# Patient Record
Sex: MALE
[De-identification: names, ages, dates, MRNs, and addresses within clinical notes are randomized per-mention and may not be internally consistent; named-entity substitution may affect disease eponyms.]

---

## 2020-02-21 ENCOUNTER — HOSPITAL ENCOUNTER (INPATIENT)
Dept: HOSPITAL 5 - 3A | Age: 70
LOS: 5 days | Discharge: HOME | DRG: 885 | End: 2020-02-26
Attending: PSYCHIATRY & NEUROLOGY | Admitting: PSYCHIATRY & NEUROLOGY
Payer: MEDICARE

## 2020-02-21 DIAGNOSIS — I42.9: ICD-10-CM

## 2020-02-21 DIAGNOSIS — N40.0: ICD-10-CM

## 2020-02-21 DIAGNOSIS — Z60.2: ICD-10-CM

## 2020-02-21 DIAGNOSIS — F32.2: Primary | ICD-10-CM

## 2020-02-21 DIAGNOSIS — B20: ICD-10-CM

## 2020-02-21 DIAGNOSIS — I10: ICD-10-CM

## 2020-02-21 DIAGNOSIS — C18.9: ICD-10-CM

## 2020-02-21 DIAGNOSIS — Z79.899: ICD-10-CM

## 2020-02-21 LAB
ALBUMIN SERPL-MCNC: 3.3 G/DL (ref 3.9–5)
ALT SERPL-CCNC: 15 UNITS/L (ref 7–56)
ANISOCYTOSIS BLD QL SMEAR: (no result)
BAND NEUTROPHILS # (MANUAL): 0 K/MM3
BILIRUB UR QL STRIP: (no result)
BLOOD UR QL VISUAL: (no result)
BUN SERPL-MCNC: 14 MG/DL (ref 9–20)
BUN/CREAT SERPL: 16 %
CALCIUM SERPL-MCNC: 9.4 MG/DL (ref 8.4–10.2)
HCT VFR BLD CALC: 41.8 % (ref 35.5–45.6)
HDLC SERPL-MCNC: 49 MG/DL (ref 40–59)
HEMOLYSIS INDEX: 14
HGB BLD-MCNC: 13.5 GM/DL (ref 11.8–15.2)
MCHC RBC AUTO-ENTMCNC: 32 % (ref 32–34)
MCV RBC AUTO: 105 FL (ref 84–94)
MUCOUS THREADS #/AREA URNS HPF: (no result) /HPF
MYELOCYTES # (MANUAL): 0 K/MM3
PH UR STRIP: 8 [PH] (ref 5–7)
PLATELET # BLD: 239 K/MM3 (ref 140–440)
PROMYELOCYTES # (MANUAL): 0 K/MM3
PROT UR STRIP-MCNC: (no result) MG/DL
RBC # BLD AUTO: 3.98 M/MM3 (ref 3.65–5.03)
RBC #/AREA URNS HPF: 4 /HPF (ref 0–6)
TOTAL CELLS COUNTED BLD: 100
UROBILINOGEN UR-MCNC: < 2 MG/DL (ref ?–2)
WBC #/AREA URNS HPF: < 1 /HPF (ref 0–6)

## 2020-02-21 PROCEDURE — 82962 GLUCOSE BLOOD TEST: CPT

## 2020-02-21 PROCEDURE — 80061 LIPID PANEL: CPT

## 2020-02-21 PROCEDURE — 84443 ASSAY THYROID STIM HORMONE: CPT

## 2020-02-21 PROCEDURE — 80053 COMPREHEN METABOLIC PANEL: CPT

## 2020-02-21 PROCEDURE — 94760 N-INVAS EAR/PLS OXIMETRY 1: CPT

## 2020-02-21 PROCEDURE — 81001 URINALYSIS AUTO W/SCOPE: CPT

## 2020-02-21 PROCEDURE — 83036 HEMOGLOBIN GLYCOSYLATED A1C: CPT

## 2020-02-21 PROCEDURE — 85025 COMPLETE CBC W/AUTO DIFF WBC: CPT

## 2020-02-21 PROCEDURE — 85007 BL SMEAR W/DIFF WBC COUNT: CPT

## 2020-02-21 PROCEDURE — 36415 COLL VENOUS BLD VENIPUNCTURE: CPT

## 2020-02-21 RX ADMIN — DOXEPIN HYDROCHLORIDE SCH MG: 10 CAPSULE ORAL at 22:06

## 2020-02-21 SDOH — SOCIAL STABILITY - SOCIAL INSECURITY: PROBLEMS RELATED TO LIVING ALONE: Z60.2

## 2020-02-22 RX ADMIN — SERTRALINE SCH MG: 50 TABLET, FILM COATED ORAL at 10:21

## 2020-02-22 RX ADMIN — APIXABAN SCH MG: 5 TABLET, FILM COATED ORAL at 10:21

## 2020-02-22 RX ADMIN — MIRTAZAPINE SCH MG: 15 TABLET, FILM COATED ORAL at 21:38

## 2020-02-22 RX ADMIN — METOPROLOL SUCCINATE SCH MG: 25 TABLET, FILM COATED, EXTENDED RELEASE ORAL at 10:22

## 2020-02-22 RX ADMIN — DOXEPIN HYDROCHLORIDE SCH MG: 10 CAPSULE ORAL at 21:38

## 2020-02-22 RX ADMIN — APIXABAN SCH MG: 5 TABLET, FILM COATED ORAL at 21:46

## 2020-02-22 RX ADMIN — LOSARTAN POTASSIUM SCH MG: 25 TABLET, FILM COATED ORAL at 10:22

## 2020-02-22 RX ADMIN — PANTOPRAZOLE SODIUM SCH MG: 40 TABLET, DELAYED RELEASE ORAL at 10:21

## 2020-02-22 RX ADMIN — DOXEPIN HYDROCHLORIDE SCH MG: 10 CAPSULE ORAL at 21:43

## 2020-02-22 NOTE — HISTORY AND PHYSICAL REPORT
GP History & Physical





- History of Present Illness


Date of admission: 02/21/20


Date of Examination: 02/22/20


Reason for Admission: Danger to self, Severe anxiety/depression


History of Present Illness: 


Darryl Lema is a 71y/o male patient who states he was admitted for "taking 

some pills." The patient says it was an "attempt to take my life." He is a/o x 

4. He is dressed appropriately. He is talkative and pleasant. He makes good eye 

contact. He is calm and cooperative. Mr. Lema says he's had "a number of 

setbacks medically." He says "because of this I OD intentional." He says "I do 

feel it was a big mistake, but I do think about dying and the afterlife." The 

patient denies ever attempting suicide or self harm behaviors in the past. He 

denies any illicit drug use, nicotine or alcohol use. He also denies any 

outpatient psychiatric treatment. The patient says he has a history of 

"depression" and he sees his "primary doctor to manage it." He says he is 

"somewhat depressed now and gets emotional talking about his health." The alex

ent states "I'm really anxious about the cancer." 





PAST PSYCHIATRIC HISTORY: 


Diagnoses: Depression, Anxiety


Suicide attempts or Self-harm behavior: this time only


Prior psychiatric hospitalizations: Denies


Substance Abuse history: Denies


 Previous psychiatric medications tried: Zoloft, ambien


Outpatient treatment: Primary





PAST MEDICAL HISTORY: 


Colon Cancer, HIV, BPH, HTN





Family Psychiatric History


None reported





SOCIAL HISTORY


Marital Status: Single


Living Arrangements: Lives alone


Employment Status: Retired


Access to guns/weapons: Denied


Education: 2 years of college


History of Abuse: Denies


 Legal History: Denies





ROS:


Constitutional: Negative for weight loss


ENT: Negative for stridor


Respiratory: Negative for cough or hemoptysis


All other systems reviewed and are negative





MSE


 Appearance: Dressed appropriately, good eye contact


 Behavior: calm and cooperative


 Mood: "somewhat depressed"


 Affect: Congruent


 Thought Process: goal directed


 Speech: normal tone and pace


 Thought Content


    Suicidal: Suicide attempt, passive thoughts at present


    Homicidal: Denies


    Hallucinations: Denies


    Delusions: Denies


 Consciousness: Alert


 Cognition/Memory: Good


 Insight/Judgment: Limited





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





Treatment Plan 


Due to the psychiatric conditions and treatment listed in the Assessment and 

Plan - the patient requires continued hospitalization.


Will continue inpatient treatment to allow for medication adjustment and 

monitoring.


Will continue q15 min safety checks.


Will encourage the use of environmental modifications and non-pharmacologic 

approaches for the management of behavioral and psychological symptoms.


Medication adjustment made today


Will continue current psych medications


Monitor for medication side effects.


The patient will continue on medications for physical illnesses, and Hospitalist

will closely monitor these


Continue intensive physical and occupational therapies.


Monitor patient's mood, sleep, appetite, and behavior closely.


Encourage patient to participate in individual and group therapeutic sessions on

the elizabeth.


Will provide a safe and therapeutic environment for patient.


This is an acknowledgement statement that Dennies Gilomen is a 71y/o male who 

requires inpatient psychiatric admission for treatment which could reasonably be

expected to improve the patient's condition for Major Depressive Disorder, 

Severe with a Suicidal Attempt. 


Estimated period of time patient will need to remain in the hospital: [7 ]


Legal Status: Voluntary


Reaction to Hospitalization: Accepting





Medications and Allergies


                                    Allergies











Allergy/AdvReac Type Severity Reaction Status Date / Time


 


No Known Allergies Allergy   Unverified 02/21/20 14:20











                                Home Medications











 Medication  Instructions  Recorded  Confirmed  Last Taken  Type


 


Ambien 10 mg PO HS 02/22/20 02/22/20 Unknown History


 


Ciprofloxacin HCl 500 mg PO BID 02/22/20 02/22/20 Unknown History


 


Descovy 200-25 mg Tablet 1 tab PO DAILY 02/22/20 02/22/20 Unknown History


 


Eliquis 5 mg PO BID 02/22/20 02/22/20 Unknown History


 


Lamictal 25 mg PO DAILY 02/22/20 02/22/20 Unknown History


 


Losartan 25 mg PO DAILY 02/22/20 02/22/20 Unknown History


 


Metoprolol Succinate 25 mg PO DAILY 02/22/20 02/22/20 Unknown History


 


Metronidazole 500 mg PO Q8HR MDD 21 TABLETS 02/22/20 02/22/20 Unknown History


 


Mirtazapine 7.5 mg PO QHS 02/22/20 02/22/20 Unknown History


 


Pantoprazole Sodium 40 mg PO DAILY 02/22/20 02/22/20 Unknown History


 


Sertraline 50 mg PO DAILY 02/22/20 02/22/20 Unknown History


 


Tivicay 50 mg PO DAILY 02/22/20 02/22/20 Unknown History


 


Xanax TAB 0.5 mg PO BID PRN 02/22/20 02/22/20 Unknown History











Active Meds: 


Active Medications





Alprazolam (Xanax)  0.5 mg PO BID PRN


   PRN Reason: Anxiety


Apixaban (Eliquis)  5 mg PO Q12HR [facility]


Doxepin HCl (Sinequan)  10 mg PO QHS [facility]


   Last Admin: 02/21/20 22:06 Dose:  10 mg


   Documented by: 


Melatonin (Melatonin)  5 mg PO QHS PRN


   PRN Reason: Sleep


   Last Admin: 02/21/20 22:06 Dose:  5 mg


   Documented by: 


Mirtazapine (Remeron)  7.5 mg PO QHS [facility]


Miscellaneous Medication (Descovy 200-25 Mg Tablet)  1 tab PO DAILY [facility]


Miscellaneous Medication (Lamictal)  25 mg PO DAILY [facility]


Miscellaneous Medication (Losartan)  25 mg PO DAILY [facility]


Miscellaneous Medication (Metoprolol Succinate)  25 mg PO DAILY [facility]


Miscellaneous Medication (Pantoprazole Sodium)  40 mg PO DAILY [facility]


Miscellaneous Medication (Tivicay)  50 mg PO DAILY [facility]


Miscellaneous Medication (Xanax Tab)  0.5 mg PO BID PRN


   PRN Reason: Anxiety


Sertraline HCl (Zoloft)  50 mg PO QDAY [facility]


Ziprasidone (Geodon)  10 mg IM Q4H PRN


   PRN Reason: Agitation











Results





- Results


Labs/Vitals: 


                             Laboratory Last Values











WBC  13.0 K/mm3 (4.5-11.0)  H  02/21/20  21:05    


 


RBC  3.98 M/mm3 (3.65-5.03)   02/21/20  21:05    


 


Hgb  13.5 gm/dl (11.8-15.2)   02/21/20  21:05    


 


Hct  41.8 % (35.5-45.6)   02/21/20  21:05    


 


MCV  105 fl (84-94)  H  02/21/20  21:05    


 


MCH  34 pg (28-32)  H  02/21/20  21:05    


 


MCHC  32 % (32-34)   02/21/20  21:05    


 


RDW  13.7 % (13.2-15.2)   02/21/20  21:05    


 


Plt Count  239 K/mm3 (140-440)   02/21/20  21:05    


 


Lymph #  Np   02/21/20  21:05    


 


Add Manual Diff  Complete   02/21/20  21:05    


 


Total Counted  100   02/21/20  21:05    


 


Seg Neuts % (Manual)  53.0 % (40.0-70.0)   02/21/20  21:05    


 


Band Neutrophils %  0 %  02/21/20  21:05    


 


Lymphocytes % (Manual)  33.0 % (13.4-35.0)   02/21/20  21:05    


 


Reactive Lymphs % (Man)  0 %  02/21/20  21:05    


 


Monocytes % (Manual)  11.0 % (0.0-7.3)  H  02/21/20  21:05    


 


Eosinophils % (Manual)  3.0 % (0.0-4.3)   02/21/20  21:05    


 


Basophils % (Manual)  0 % (0.0-1.8)   02/21/20  21:05    


 


Metamyelocytes %  0 %  02/21/20  21:05    


 


Myelocytes %  0 %  02/21/20  21:05    


 


Promyelocytes %  0 %  02/21/20  21:05    


 


Blast Cells %  0 %  02/21/20  21:05    


 


Nucleated RBC %  Not Reportable   02/21/20  21:05    


 


Seg Neutrophils # Man  6.9 K/mm3 (1.8-7.7)   02/21/20  21:05    


 


Band Neutrophils #  0.0 K/mm3  02/21/20  21:05    


 


Lymphocytes # (Manual)  4.3 K/mm3 (1.2-5.4)   02/21/20  21:05    


 


Abs React Lymphs (Man)  0.0 K/mm3  02/21/20  21:05    


 


Monocytes # (Manual)  1.4 K/mm3 (0.0-0.8)  H  02/21/20  21:05    


 


Eosinophils # (Manual)  0.4 K/mm3 (0.0-0.4)   02/21/20  21:05    


 


Basophils # (Manual)  0.0 K/mm3 (0.0-0.1)   02/21/20  21:05    


 


Metamyelocytes #  0.0 K/mm3  02/21/20  21:05    


 


Myelocytes #  0.0 K/mm3  02/21/20  21:05    


 


Promyelocytes #  0.0 K/mm3  02/21/20  21:05    


 


Blast Cells #  0.0 K/mm3  02/21/20  21:05    


 


WBC Morphology  Not Reportable   02/21/20  21:05    


 


Hypersegmented Neuts  Not Reportable   02/21/20  21:05    


 


Hyposegmented Neuts  Not Reportable   02/21/20  21:05    


 


Hypogranular Neuts  Not Reportable   02/21/20  21:05    


 


Smudge Cells  Not Reportable   02/21/20  21:05    


 


Toxic Granulation  Not Reportable   02/21/20  21:05    


 


Toxic Vacuolation  Not Reportable   02/21/20  21:05    


 


Dohle Bodies  Not Reportable   02/21/20  21:05    


 


Pelger-Huet Anomaly  Not Reportable   02/21/20  21:05    


 


Lucio Rods  Not Reportable   02/21/20  21:05    


 


Platelet Estimate  Consistent w auto   02/21/20  21:05    


 


Clumped Platelets  Not Reportable   02/21/20  21:05    


 


Plt Clumps, EDTA  Not Reportable   02/21/20  21:05    


 


Large Platelets  Not Reportable   02/21/20  21:05    


 


Giant Platelets  Not Reportable   02/21/20  21:05    


 


Platelet Satelliting  Not Reportable   02/21/20  21:05    


 


Plt Morphology Comment  Not Reportable   02/21/20  21:05    


 


RBC Morphology  Not Reportable   02/21/20  21:05    


 


Dimorphic RBCs  Not Reportable   02/21/20  21:05    


 


Polychromasia  Not Reportable   02/21/20  21:05    


 


Hypochromasia  Not Reportable   02/21/20  21:05    


 


Poikilocytosis  Not Reportable   02/21/20  21:05    


 


Anisocytosis  1+   02/21/20  21:05    


 


Microcytosis  Not Reportable   02/21/20  21:05    


 


Macrocytosis  Not Reportable   02/21/20  21:05    


 


Spherocytes  Not Reportable   02/21/20  21:05    


 


Pappenheimer Bodies  Not Reportable   02/21/20  21:05    


 


Sickle Cells  Not Reportable   02/21/20  21:05    


 


Target Cells  Not Reportable   02/21/20  21:05    


 


Tear Drop Cells  Not Reportable   02/21/20  21:05    


 


Ovalocytes  Not Reportable   02/21/20  21:05    


 


Helmet Cells  Not Reportable   02/21/20  21:05    


 


[place] Bodies  Not Reportable   02/21/20  21:05    


 


Cabot Rings  Not Reportable   02/21/20  21:05    


 


Elie Cells  Not Reportable   02/21/20  21:05    


 


Bite Cells  Not Reportable   02/21/20  21:05    


 


Crenated Cell  Not Reportable   02/21/20  21:05    


 


Elliptocytes  Not Reportable   02/21/20  21:05    


 


Acanthocytes (Spur)  Not Reportable   02/21/20  21:05    


 


Rouleaux  Not Reportable   02/21/20  21:05    


 


Hemoglobin C Crystals  Not Reportable   02/21/20  21:05    


 


Schistocytes  Not Reportable   02/21/20  21:05    


 


Malaria parasites  Not Reportable   02/21/20  21:05    


 


Sarabjit Bodies  Not Reportable   02/21/20  21:05    


 


Hem Pathologist Commnt  No   02/21/20  21:05    


 


Sodium  140 mmol/L (137-145)   02/21/20  21:05    


 


Potassium  4.4 mmol/L (3.6-5.0)   02/21/20  21:05    


 


Chloride  100.7 mmol/L ()   02/21/20  21:05    


 


Carbon Dioxide  25 mmol/L (22-30)   02/21/20  21:05    


 


Anion Gap  19 mmol/L  02/21/20  21:05    


 


BUN  14 mg/dL (9-20)   02/21/20  21:05    


 


Creatinine  0.9 mg/dL (0.8-1.5)   02/21/20  21:05    


 


Estimated GFR  > 60 ml/min  02/21/20  21:05    


 


BUN/Creatinine Ratio  16 %  02/21/20  21:05    


 


Glucose  145 mg/dL ()  H  02/21/20  21:05    


 


POC Glucose  157  ()  H  02/21/20  22:02    


 


Hemoglobin A1c  4.9 % (4-6)   02/21/20  21:05    


 


Calcium  9.4 mg/dL (8.4-10.2)   02/21/20  21:05    


 


Total Bilirubin  0.50 mg/dL (0.1-1.2)   02/21/20  21:05    


 


AST  33 units/L (5-40)   02/21/20  21:05    


 


ALT  15 units/L (7-56)   02/21/20  21:05    


 


Alkaline Phosphatase  73 units/L ()   02/21/20  21:05    


 


Total Protein  7.5 g/dL (6.3-8.2)   02/21/20  21:05    


 


Albumin  3.3 g/dL (3.9-5)  L  02/21/20  21:05    


 


Albumin/Globulin Ratio  0.8 %  02/21/20  21:05    


 


Triglycerides  59 mg/dL (2-149)   02/21/20  21:05    


 


Cholesterol  149 mg/dL ()   02/21/20  21:05    


 


LDL Cholesterol Direct  92 mg/dL ()   02/21/20  21:05    


 


HDL Cholesterol  49 mg/dL (40-59)   02/21/20  21:05    


 


Cholesterol/HDL Ratio  3.04 %  02/21/20  21:05    


 


TSH  4.370 mlU/mL (0.270-4.200)  H  02/21/20  21:05    


 


Urine Color  Yellow  (Yellow)   02/21/20  Unknown


 


Urine Turbidity  Clear  (Clear)   02/21/20  Unknown


 


Urine pH  8.0  (5.0-7.0)  H  02/21/20  Unknown


 


Ur Specific Gravity  1.009  (1.003-1.030)   02/21/20  Unknown


 


Urine Protein  <15 mg/dl mg/dL (Negative)   02/21/20  Unknown


 


Urine Glucose (UA)  Neg mg/dL (Negative)   02/21/20  Unknown


 


Urine Ketones  Neg mg/dL (Negative)   02/21/20  Unknown


 


Urine Blood  Neg  (Negative)   02/21/20  Unknown


 


Urine Nitrite  Neg  (Negative)   02/21/20  Unknown


 


Urine Bilirubin  Neg  (Negative)   02/21/20  Unknown


 


Urine Urobilinogen  < 2.0 mg/dL (<2.0)   02/21/20  Unknown


 


Ur Leukocyte Esterase  Neg  (Negative)   02/21/20  Unknown


 


Urine WBC (Auto)  < 1.0 /HPF (0.0-6.0)   02/21/20  Unknown


 


Urine RBC (Auto)  4.0 /HPF (0.0-6.0)   02/21/20  Unknown


 


Urine Mucus  Few /HPF  02/21/20  Unknown








                                Last Vital Signs











Temp  97.7 F   02/21/20 22:00


 


Pulse  64   02/21/20 22:00


 


Resp  18   02/21/20 22:00


 


BP  156/70   02/21/20 22:00


 


Pulse Ox  91   02/21/20 22:00














Physical Examination





- Constitutional


Vitals: 


                                   Vital Signs











Temp Pulse Resp BP Pulse Ox


 


 97.7 F   64   18   156/70   91 


 


 02/21/20 22:00  02/21/20 22:00  02/21/20 22:00  02/21/20 22:00  02/21/20 22:00








                           Temperature -Last 24 Hours











Temperature                    97.7 F

















Mental Status Exam





- Vital signs


                                Last Vital Signs











Temp  97.7 F   02/21/20 22:00


 


Pulse  64   02/21/20 22:00


 


Resp  18   02/21/20 22:00


 


BP  156/70   02/21/20 22:00


 


Pulse Ox  91   02/21/20 22:00














Physician Certification





- Certification Statement


Physician Certification Statement: 


This is an acknowledgement statement that DARRYL LEMA is a 70 year old M who 

requires inpatient psychiatric admission for treatment which could reasonably be

expected to improve the patient's condition for 





Estimated period of time patient will need to remain in the hospital: [ ]





Plan for post-hospital care: [ ]

## 2020-02-22 NOTE — CONSULTATION
History of Present Illness





- Reason for Consult


Consult date: 02/22/20





- History of Present Illness





70-year-old male with past medical history of major depression, cardiomyopathy, 

colon cancer, HIV, BPH and hypertension who presented to the [facility] psych unit 

with suicide attempt after attempting to take "some pills".  The patient denies 

any complaints of chest pain or shortness of breath.  No headache or visual 

disturbances.  No cough or cold-like symptoms.  No fever chills.  Consultation 

was called for the hospitalist service for management of his above medical 

problems.





Past History


Past Medical History: HIV/AIDS, hypertension, other (Depression, BPH, colon 

cancer)


Past Surgical History: Other (Rectal surgery)


Social history: no significant social history


Family history: no significant family history





Medications and Allergies


                                    Allergies











Allergy/AdvReac Type Severity Reaction Status Date / Time


 


No Known Allergies Allergy   Unverified 02/21/20 14:20











                                Home Medications











 Medication  Instructions  Recorded  Confirmed  Last Taken  Type


 


Ambien 10 mg PO HS 02/22/20 02/22/20 Unknown History


 


Ciprofloxacin HCl 500 mg PO BID 02/22/20 02/22/20 Unknown History


 


Descovy 200-25 mg Tablet 1 tab PO DAILY 02/22/20 02/22/20 Unknown History


 


Eliquis 5 mg PO BID 02/22/20 02/22/20 Unknown History


 


Lamictal 25 mg PO DAILY 02/22/20 02/22/20 Unknown History


 


Losartan 25 mg PO DAILY 02/22/20 02/22/20 Unknown History


 


Metoprolol Succinate 25 mg PO DAILY 02/22/20 02/22/20 Unknown History


 


Metronidazole 500 mg PO Q8HR MDD 21 TABLETS 02/22/20 02/22/20 Unknown History


 


Mirtazapine 7.5 mg PO QHS 02/22/20 02/22/20 Unknown History


 


Pantoprazole Sodium 40 mg PO DAILY 02/22/20 02/22/20 Unknown History


 


Sertraline 50 mg PO DAILY 02/22/20 02/22/20 Unknown History


 


Tivicay 50 mg PO DAILY 02/22/20 02/22/20 Unknown History


 


Xanax TAB 0.5 mg PO BID PRN 02/22/20 02/22/20 Unknown History











Active Meds: 


Active Medications





Alprazolam (Xanax)  0.5 mg PO BID PRN


   PRN Reason: Anxiety


Apixaban (Eliquis)  5 mg PO Q12HR MARITZA


   Last Admin: 02/22/20 10:21 Dose:  5 mg


   Documented by: 


Doxepin HCl (Sinequan)  10 mg PO QHS [facility]


   Last Admin: 02/21/20 22:06 Dose:  10 mg


   Documented by: 


Lamotrigine (Lamictal)  25 mg PO QDAY [facility]


   Last Admin: 02/22/20 10:21 Dose:  25 mg


   Documented by: 


Losartan Potassium (Cozaar)  25 mg PO QDAY [facility]


   Last Admin: 02/22/20 10:22 Dose:  25 mg


   Documented by: 


Melatonin (Melatonin)  5 mg PO QHS PRN


   PRN Reason: Sleep


   Last Admin: 02/21/20 22:06 Dose:  5 mg


   Documented by: 


Metoprolol Succinate (Metoprolol Xl)  25 mg PO QDAY [facility]


   Last Admin: 02/22/20 10:22 Dose:  25 mg


   Documented by: 


Mirtazapine (Remeron)  7.5 mg PO QHS [facility]


Miscellaneous Medication (Descovy 200-25 Mg Tablet)  1 tab PO DAILY [facility]


   Last Admin: 02/22/20 10:24 Dose:  1 tab


   Documented by: 


Miscellaneous Medication (Tivicay)  50 mg PO DAILY [facility]


   Last Admin: 02/22/20 10:23 Dose:  50 mg


   Documented by: 


Pantoprazole Sodium (Protonix)  40 mg PO DAILY [facility]


   Last Admin: 02/22/20 10:21 Dose:  40 mg


   Documented by: 


Sertraline HCl (Zoloft)  100 mg PO QDAY [facility]


   Last Admin: 02/22/20 10:21 Dose:  100 mg


   Documented by: 


Ziprasidone (Geodon)  10 mg IM Q4H PRN


   PRN Reason: Agitation











Review of Systems


All systems: negative





Exam





- Constitutional


Vitals: 


                                        











Temp Pulse Resp BP Pulse Ox


 


 97.7 F   68   18   135/64   91 


 


 02/21/20 22:00  02/22/20 10:22  02/21/20 22:00  02/22/20 10:22  02/21/20 22:00











General appearance: Present: no acute distress, well-nourished





- EENT


Eyes: Present: PERRL


ENT: hearing intact, clear oral mucosa





- Neck


Neck: Present: supple, normal ROM





- Respiratory


Respiratory effort: normal


Respiratory: bilateral: CTA





- Cardiovascular


Heart Sounds: Present: S1 & S2.  Absent: rub, click





- Extremities


Extremities: pulses symmetrical, No edema


Peripheral Pulses: within normal limits





- Abdominal


General gastrointestinal: Present: soft, non-tender, non-distended, normal bowel

sounds


Male genitourinary: Present: normal





- Integumentary


Integumentary: Present: clear, warm, dry





- Musculoskeletal


Musculoskeletal: gait normal, strength equal bilaterally





- Psychiatric


Psychiatric: appropriate mood/affect, intact judgment & insight





- Neurologic


Neurologic: CNII-XII intact, moves all extremities





Results





- Labs


CBC & Chem 7: 


                                 02/21/20 21:05





                                 02/21/20 21:05


Labs: 


                              Abnormal lab results











  02/21/20 02/21/20 02/21/20 Range/Units





  21:05 21:05 21:05 


 


WBC  13.0 H    (4.5-11.0)  K/mm3


 


MCV  105 H    (84-94)  fl


 


MCH  34 H    (28-32)  pg


 


Monocytes % (Manual)  11.0 H    (0.0-7.3)  %


 


Monocytes # (Manual)  1.4 H    (0.0-0.8)  K/mm3


 


Glucose   145 H   ()  mg/dL


 


POC Glucose     ()  


 


Albumin   3.3 L   (3.9-5)  g/dL


 


TSH    4.370 H  (0.270-4.200)  mlU/mL


 


Urine pH     (5.0-7.0)  














  02/21/20 02/21/20 Range/Units





  22:02 Unknown 


 


WBC    (4.5-11.0)  K/mm3


 


MCV    (84-94)  fl


 


MCH    (28-32)  pg


 


Monocytes % (Manual)    (0.0-7.3)  %


 


Monocytes # (Manual)    (0.0-0.8)  K/mm3


 


Glucose    ()  mg/dL


 


POC Glucose  157 H   ()  


 


Albumin    (3.9-5)  g/dL


 


TSH    (0.270-4.200)  mlU/mL


 


Urine pH   8.0 H  (5.0-7.0)  














Assessment and Plan





Major depression/suicide attempt.  Continue per psychiatry.





Hypertension.  Resume antihypertensive medications.





HIV.  Continue medications.





Cardiomyopathy.  Continue medications and anticoagulation.

## 2020-02-23 RX ADMIN — LOSARTAN POTASSIUM SCH: 25 TABLET, FILM COATED ORAL at 09:36

## 2020-02-23 RX ADMIN — METOPROLOL SUCCINATE SCH: 25 TABLET, FILM COATED, EXTENDED RELEASE ORAL at 09:36

## 2020-02-23 RX ADMIN — SERTRALINE SCH MG: 50 TABLET, FILM COATED ORAL at 09:34

## 2020-02-23 RX ADMIN — MIRTAZAPINE SCH MG: 15 TABLET, FILM COATED ORAL at 21:52

## 2020-02-23 RX ADMIN — APIXABAN SCH MG: 5 TABLET, FILM COATED ORAL at 09:32

## 2020-02-23 RX ADMIN — DOXEPIN HYDROCHLORIDE SCH MG: 10 CAPSULE ORAL at 21:52

## 2020-02-23 RX ADMIN — APIXABAN SCH MG: 5 TABLET, FILM COATED ORAL at 21:52

## 2020-02-23 RX ADMIN — PANTOPRAZOLE SODIUM SCH MG: 40 TABLET, DELAYED RELEASE ORAL at 09:34

## 2020-02-23 NOTE — PROGRESS NOTE
Subjective


Date of service: 02/23/20


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


Subjective Comment: 


During my interview with the patient this morning, he was standing in his room 

getting ready for breakfast. He is pleasant, calm and cooperative. He makes good

eye contact. He says his "night went pretty good." He says his mood "is actually

pretty good today, but at times depressed." The patient says his appetite is 

"okay." He says "I don't finish my food. It's too much." He denies 

hallucinations of any kind. When asking about thoughts of self harm or thoughts 

of dying, the patient replied "well, I know I talked about it yesterday, but 

today I don't feel that way." He says "I know it's my situation that makes me 

think about it sometimes." He says "too much hits at one time." 





Reason for continued inpatient treatment: The patient still has depression, and 

appears to have passive thoughts of dying on and off. 





ROS:


Constitutional: Negative for weight loss


ENT: Negative for stridor


Respiratory: Negative for cough or hemoptysis


All other systems reviewed and are negative





MSE


 Appearance: Dressed appropriately, good eye contact


 Behavior: calm and cooperative


 Mood: "somewhat depressed"


 Affect: Congruent


 Thought Process: goal directed


 Speech: normal tone and pace


 Thought Content


    Suicidal: Suicide attempt, passive thoughts at present


    Homicidal: Denies


    Hallucinations: Denies


    Delusions: Denies


 Consciousness: Alert


 Cognition/Memory: Good


 Insight/Judgment: Limited





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





Treatment Plan 


Due to the psychiatric conditions and treatment listed in the Assessment and 

Plan - the patient requires continued hospitalization.


Will continue inpatient treatment to allow for medication adjustment and 

monitoring.


Will continue q15 min safety checks.


Will encourage the use of environmental modifications and non-pharmacologic 

approaches for the management of behavioral and psychological symptoms.


Medication adjustment made today: No changes made today. Increased Zoloft to 

100mg po daily yesterday to improve depressive symptoms


Will continue current psych medications


Monitor for medication side effects.


The patient will continue on medications for physical illnesses, and Hospitalist

will closely monitor these


Continue intensive physical and occupational therapies.


Monitor patient's mood, sleep, appetite, and behavior closely.


Encourage patient to participate in individual and group therapeutic sessions on

the [facility].


Will provide a safe and therapeutic environment for patient.








Medications and Allergies


                                    Allergies











Allergy/AdvReac Type Severity Reaction Status Date / Time


 


No Known Allergies Allergy   Unverified 02/21/20 14:20











                                Home Medications











 Medication  Instructions  Recorded  Confirmed  Last Taken  Type


 


Ambien 10 mg PO HS 02/22/20 02/22/20 Unknown History


 


Ciprofloxacin HCl 500 mg PO BID 02/22/20 02/22/20 Unknown History


 


Descovy 200-25 mg Tablet 1 tab PO DAILY 02/22/20 02/22/20 Unknown History


 


Eliquis 5 mg PO BID 02/22/20 02/22/20 Unknown History


 


Lamictal 25 mg PO DAILY 02/22/20 02/22/20 Unknown History


 


Losartan 25 mg PO DAILY 02/22/20 02/22/20 Unknown History


 


Metoprolol Succinate 25 mg PO DAILY 02/22/20 02/22/20 Unknown History


 


Metronidazole 500 mg PO Q8HR MDD 21 TABLETS 02/22/20 02/22/20 Unknown History


 


Mirtazapine 7.5 mg PO QHS 02/22/20 02/22/20 Unknown History


 


Pantoprazole Sodium 40 mg PO DAILY 02/22/20 02/22/20 Unknown History


 


Sertraline 50 mg PO DAILY 02/22/20 02/22/20 Unknown History


 


Tivicay 50 mg PO DAILY 02/22/20 02/22/20 Unknown History


 


Xanax TAB 0.5 mg PO BID PRN 02/22/20 02/22/20 Unknown History











Active Meds: 


Active Medications





Alprazolam (Xanax)  0.5 mg PO BID PRN


   PRN Reason: Anxiety


Apixaban (Eliquis)  5 mg PO Q12HR [facility]


   Last Admin: 02/22/20 21:46 Dose:  5 mg


   Documented by: 


Doxepin HCl (Sinequan)  10 mg PO QHS [facility]


   Last Admin: 02/22/20 21:43 Dose:  10 mg


   Documented by: 


Lamotrigine (Lamictal)  25 mg PO QDAY [facility]


   Last Admin: 02/22/20 10:21 Dose:  25 mg


   Documented by: 


Losartan Potassium (Cozaar)  25 mg PO QDAY [facility]


   Last Admin: 02/22/20 10:22 Dose:  25 mg


   Documented by: 


Melatonin (Melatonin)  5 mg PO QHS PRN


   PRN Reason: Sleep


   Last Admin: 02/21/20 22:06 Dose:  5 mg


   Documented by: 


Metoprolol Succinate (Metoprolol Xl)  25 mg PO QDAY [facility]


   Last Admin: 02/22/20 10:22 Dose:  25 mg


   Documented by: 


Mirtazapine (Remeron)  7.5 mg PO QHS [facility]


   Last Admin: 02/22/20 21:38 Dose:  7.5 mg


   Documented by: 


Miscellaneous Medication (Descovy 200-25 Mg Tablet)  1 tab PO DAILY [facility]


   Last Admin: 02/22/20 10:24 Dose:  1 tab


   Documented by: 


Miscellaneous Medication (Tivicay)  50 mg PO DAILY [facility]


   Last Admin: 02/22/20 10:23 Dose:  50 mg


   Documented by: 


Pantoprazole Sodium (Protonix)  40 mg PO DAILY [facility]


   Last Admin: 02/22/20 10:21 Dose:  40 mg


   Documented by: 


Sertraline HCl (Zoloft)  100 mg PO QDAY [facility]


   Last Admin: 02/22/20 10:21 Dose:  100 mg


   Documented by: 


Ziprasidone (Geodon)  10 mg IM Q4H PRN


   PRN Reason: Agitation











Results





- Results


Labs/Vitals: 


                             Laboratory Last Values











WBC  13.0 K/mm3 (4.5-11.0)  H  02/21/20  21:05    


 


RBC  3.98 M/mm3 (3.65-5.03)   02/21/20  21:05    


 


Hgb  13.5 gm/dl (11.8-15.2)   02/21/20  21:05    


 


Hct  41.8 % (35.5-45.6)   02/21/20  21:05    


 


MCV  105 fl (84-94)  H  02/21/20  21:05    


 


MCH  34 pg (28-32)  H  02/21/20  21:05    


 


MCHC  32 % (32-34)   02/21/20  21:05    


 


RDW  13.7 % (13.2-15.2)   02/21/20  21:05    


 


Plt Count  239 K/mm3 (140-440)   02/21/20  21:05    


 


Lymph #  Np   02/21/20  21:05    


 


Add Manual Diff  Complete   02/21/20  21:05    


 


Total Counted  100   02/21/20  21:05    


 


Seg Neuts % (Manual)  53.0 % (40.0-70.0)   02/21/20  21:05    


 


Band Neutrophils %  0 %  02/21/20  21:05    


 


Lymphocytes % (Manual)  33.0 % (13.4-35.0)   02/21/20  21:05    


 


Reactive Lymphs % (Man)  0 %  02/21/20  21:05    


 


Monocytes % (Manual)  11.0 % (0.0-7.3)  H  02/21/20  21:05    


 


Eosinophils % (Manual)  3.0 % (0.0-4.3)   02/21/20  21:05    


 


Basophils % (Manual)  0 % (0.0-1.8)   02/21/20  21:05    


 


Metamyelocytes %  0 %  02/21/20  21:05    


 


Myelocytes %  0 %  02/21/20  21:05    


 


Promyelocytes %  0 %  02/21/20  21:05    


 


Blast Cells %  0 %  02/21/20  21:05    


 


Nucleated RBC %  Not Reportable   02/21/20  21:05    


 


Seg Neutrophils # Man  6.9 K/mm3 (1.8-7.7)   02/21/20  21:05    


 


Band Neutrophils #  0.0 K/mm3  02/21/20  21:05    


 


Lymphocytes # (Manual)  4.3 K/mm3 (1.2-5.4)   02/21/20  21:05    


 


Abs React Lymphs (Man)  0.0 K/mm3  02/21/20  21:05    


 


Monocytes # (Manual)  1.4 K/mm3 (0.0-0.8)  H  02/21/20  21:05    


 


Eosinophils # (Manual)  0.4 K/mm3 (0.0-0.4)   02/21/20  21:05    


 


Basophils # (Manual)  0.0 K/mm3 (0.0-0.1)   02/21/20  21:05    


 


Metamyelocytes #  0.0 K/mm3  02/21/20  21:05    


 


Myelocytes #  0.0 K/mm3  02/21/20  21:05    


 


Promyelocytes #  0.0 K/mm3  02/21/20  21:05    


 


Blast Cells #  0.0 K/mm3  02/21/20  21:05    


 


WBC Morphology  Not Reportable   02/21/20  21:05    


 


Hypersegmented Neuts  Not Reportable   02/21/20  21:05    


 


Hyposegmented Neuts  Not Reportable   02/21/20  21:05    


 


Hypogranular Neuts  Not Reportable   02/21/20  21:05    


 


Smudge Cells  Not Reportable   02/21/20  21:05    


 


Toxic Granulation  Not Reportable   02/21/20  21:05    


 


Toxic Vacuolation  Not Reportable   02/21/20  21:05    


 


Dohle Bodies  Not Reportable   02/21/20  21:05    


 


Pelger-Huet Anomaly  Not Reportable   02/21/20  21:05    


 


Lucio Rods  Not Reportable   02/21/20  21:05    


 


Platelet Estimate  Consistent w auto   02/21/20  21:05    


 


Clumped Platelets  Not Reportable   02/21/20  21:05    


 


Plt Clumps, EDTA  Not Reportable   02/21/20  21:05    


 


Large Platelets  Not Reportable   02/21/20  21:05    


 


Giant Platelets  Not Reportable   02/21/20  21:05    


 


Platelet Satelliting  Not Reportable   02/21/20  21:05    


 


Plt Morphology Comment  Not Reportable   02/21/20  21:05    


 


RBC Morphology  Not Reportable   02/21/20  21:05    


 


Dimorphic RBCs  Not Reportable   02/21/20  21:05    


 


Polychromasia  Not Reportable   02/21/20  21:05    


 


Hypochromasia  Not Reportable   02/21/20  21:05    


 


Poikilocytosis  Not Reportable   02/21/20  21:05    


 


Anisocytosis  1+   02/21/20  21:05    


 


Microcytosis  Not Reportable   02/21/20  21:05    


 


Macrocytosis  Not Reportable   02/21/20  21:05    


 


Spherocytes  Not Reportable   02/21/20  21:05    


 


Pappenheimer Bodies  Not Reportable   02/21/20  21:05    


 


Sickle Cells  Not Reportable   02/21/20  21:05    


 


Target Cells  Not Reportable   02/21/20  21:05    


 


Tear Drop Cells  Not Reportable   02/21/20  21:05    


 


Ovalocytes  Not Reportable   02/21/20  21:05    


 


Helmet Cells  Not Reportable   02/21/20  21:05    


 


[place] Bodies  Not Reportable   02/21/20  21:05    


 


Cabot Rings  Not Reportable   02/21/20  21:05    


 


Mills Cells  Not Reportable   02/21/20  21:05    


 


Bite Cells  Not Reportable   02/21/20  21:05    


 


Crenated Cell  Not Reportable   02/21/20  21:05    


 


Elliptocytes  Not Reportable   02/21/20  21:05    


 


Acanthocytes (Spur)  Not Reportable   02/21/20  21:05    


 


Rouleaux  Not Reportable   02/21/20  21:05    


 


Hemoglobin C Crystals  Not Reportable   02/21/20  21:05    


 


Schistocytes  Not Reportable   02/21/20  21:05    


 


Malaria parasites  Not Reportable   02/21/20  21:05    


 


Sarabjit Bodies  Not Reportable   02/21/20  21:05    


 


Hem Pathologist Commnt  No   02/21/20  21:05    


 


Sodium  140 mmol/L (137-145)   02/21/20  21:05    


 


Potassium  4.4 mmol/L (3.6-5.0)   02/21/20  21:05    


 


Chloride  100.7 mmol/L ()   02/21/20  21:05    


 


Carbon Dioxide  25 mmol/L (22-30)   02/21/20  21:05    


 


Anion Gap  19 mmol/L  02/21/20  21:05    


 


BUN  14 mg/dL (9-20)   02/21/20  21:05    


 


Creatinine  0.9 mg/dL (0.8-1.5)   02/21/20  21:05    


 


Estimated GFR  > 60 ml/min  02/21/20  21:05    


 


BUN/Creatinine Ratio  16 %  02/21/20  21:05    


 


Glucose  145 mg/dL ()  H  02/21/20  21:05    


 


POC Glucose  157  ()  H  02/21/20  22:02    


 


Hemoglobin A1c  4.9 % (4-6)   02/21/20  21:05    


 


Calcium  9.4 mg/dL (8.4-10.2)   02/21/20  21:05    


 


Total Bilirubin  0.50 mg/dL (0.1-1.2)   02/21/20  21:05    


 


AST  33 units/L (5-40)   02/21/20  21:05    


 


ALT  15 units/L (7-56)   02/21/20  21:05    


 


Alkaline Phosphatase  73 units/L ()   02/21/20  21:05    


 


Total Protein  7.5 g/dL (6.3-8.2)   02/21/20  21:05    


 


Albumin  3.3 g/dL (3.9-5)  L  02/21/20  21:05    


 


Albumin/Globulin Ratio  0.8 %  02/21/20  21:05    


 


Triglycerides  59 mg/dL (2-149)   02/21/20  21:05    


 


Cholesterol  149 mg/dL ()   02/21/20  21:05    


 


LDL Cholesterol Direct  92 mg/dL ()   02/21/20  21:05    


 


HDL Cholesterol  49 mg/dL (40-59)   02/21/20  21:05    


 


Cholesterol/HDL Ratio  3.04 %  02/21/20  21:05    


 


TSH  4.370 mlU/mL (0.270-4.200)  H  02/21/20  21:05    


 


Urine Color  Yellow  (Yellow)   02/21/20  Unknown


 


Urine Turbidity  Clear  (Clear)   02/21/20  Unknown


 


Urine pH  8.0  (5.0-7.0)  H  02/21/20  Unknown


 


Ur Specific Gravity  1.009  (1.003-1.030)   02/21/20  Unknown


 


Urine Protein  <15 mg/dl mg/dL (Negative)   02/21/20  Unknown


 


Urine Glucose (UA)  Neg mg/dL (Negative)   02/21/20  Unknown


 


Urine Ketones  Neg mg/dL (Negative)   02/21/20  Unknown


 


Urine Blood  Neg  (Negative)   02/21/20  Unknown


 


Urine Nitrite  Neg  (Negative)   02/21/20  Unknown


 


Urine Bilirubin  Neg  (Negative)   02/21/20  Unknown


 


Urine Urobilinogen  < 2.0 mg/dL (<2.0)   02/21/20  Unknown


 


Ur Leukocyte Esterase  Neg  (Negative)   02/21/20  Unknown


 


Urine WBC (Auto)  < 1.0 /HPF (0.0-6.0)   02/21/20  Unknown


 


Urine RBC (Auto)  4.0 /HPF (0.0-6.0)   02/21/20  Unknown


 


Urine Mucus  Few /HPF  02/21/20  Unknown








                                Last Vital Signs











Temp  97.7 F   02/21/20 22:00


 


Pulse  63   02/22/20 19:06


 


Resp  18   02/21/20 22:00


 


BP  119/61   02/22/20 19:06


 


Pulse Ox  97   02/23/20 09:09

## 2020-02-24 RX ADMIN — METOPROLOL SUCCINATE SCH MG: 25 TABLET, FILM COATED, EXTENDED RELEASE ORAL at 11:56

## 2020-02-24 RX ADMIN — PANTOPRAZOLE SODIUM SCH MG: 40 TABLET, DELAYED RELEASE ORAL at 11:53

## 2020-02-24 RX ADMIN — APIXABAN SCH MG: 5 TABLET, FILM COATED ORAL at 11:55

## 2020-02-24 RX ADMIN — APIXABAN SCH MG: 5 TABLET, FILM COATED ORAL at 21:39

## 2020-02-24 RX ADMIN — DOXEPIN HYDROCHLORIDE SCH MG: 10 CAPSULE ORAL at 21:38

## 2020-02-24 RX ADMIN — LOSARTAN POTASSIUM SCH MG: 25 TABLET, FILM COATED ORAL at 11:54

## 2020-02-24 RX ADMIN — MIRTAZAPINE SCH MG: 15 TABLET, FILM COATED ORAL at 21:38

## 2020-02-24 RX ADMIN — SERTRALINE SCH MG: 50 TABLET, FILM COATED ORAL at 11:52

## 2020-02-24 NOTE — PROGRESS NOTE
Subjective


Date of service: 02/24/20


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


Subjective Comment: 


During my interview with the patient this morning, he is lying in bed. Awake. 

Pleasant, calm and cooperative. He is a/o x 3. He says he's "good and feels a 

lot better." He says he "slept okay, but could have been better. He denies 

hallucinations of any kind. When asked about suicidal ideation or any self-harm 

thoughts, the patient states, "at the time it all just hit me so hard when they 

told me about the cancer." He says "I didn't know how to deal with that 

happening to me, I think I will be okay." When asking about any passive thoughts

of dying, the patient says "I don't believe so." 





Reason for continued inpatient treatment: The patient has improved 

significantly, but appears to be uncertain at times.





ROS:


Constitutional: Negative for weight loss


ENT: Negative for stridor


Respiratory: Negative for cough or hemoptysis


All other systems reviewed and are negative





MSE


 Appearance: Dressed appropriately, good eye contact


 Behavior: calm and cooperative


 Mood: "good, alot better"


 Affect: Congruent


 Thought Process: goal directed


 Speech: normal tone and pace


 Thought Content


    Suicidal: Appears to be somewhat uncertain


    Homicidal: Denies


    Hallucinations: Denies


    Delusions: Denies


 Consciousness: Alert


 Cognition/Memory: Good


 Insight/Judgment: Limited





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





Treatment Plan 


Due to the psychiatric conditions and treatment listed in the Assessment and 

Plan - the patient requires continued hospitalization.


Will continue inpatient treatment to allow for medication adjustment and 

monitoring.


Will continue q15 min safety checks.


Will encourage the use of environmental modifications and non-pharmacologic 

approaches for the management of behavioral and psychological symptoms.


Medication adjustment made today: Zoloft was increased, 2/22. The patient seems 

to be improving from the increase. 


Will continue current psych medications


Monitor for medication side effects.


The patient will continue on medications for physical illnesses, and Hospitalist

will closely monitor these


Continue intensive physical and occupational therapies.


Monitor patient's mood, sleep, appetite, and behavior closely.


Encourage patient to participate in individual and group therapeutic sessions on

the elizabeth.


Will provide a safe and therapeutic environment for patient.








Medications and Allergies


                                    Allergies











Allergy/AdvReac Type Severity Reaction Status Date / Time


 


No Known Allergies Allergy   Unverified 02/21/20 14:20











                                Home Medications











 Medication  Instructions  Recorded  Confirmed  Last Taken  Type


 


Ambien 10 mg PO HS 02/22/20 02/22/20 Unknown History


 


Ciprofloxacin HCl 500 mg PO BID 02/22/20 02/22/20 Unknown History


 


Descovy 200-25 mg Tablet 1 tab PO DAILY 02/22/20 02/22/20 Unknown History


 


Eliquis 5 mg PO BID 02/22/20 02/22/20 Unknown History


 


Lamictal 25 mg PO DAILY 02/22/20 02/22/20 Unknown History


 


Losartan 25 mg PO DAILY 02/22/20 02/22/20 Unknown History


 


Metoprolol Succinate 25 mg PO DAILY 02/22/20 02/22/20 Unknown History


 


Metronidazole 500 mg PO Q8HR MDD 21 TABLETS 02/22/20 02/22/20 Unknown History


 


Mirtazapine 7.5 mg PO QHS 02/22/20 02/22/20 Unknown History


 


Pantoprazole Sodium 40 mg PO DAILY 02/22/20 02/22/20 Unknown History


 


Sertraline 50 mg PO DAILY 02/22/20 02/22/20 Unknown History


 


Tivicay 50 mg PO DAILY 02/22/20 02/22/20 Unknown History


 


Xanax TAB 0.5 mg PO BID PRN 02/22/20 02/22/20 Unknown History











Active Meds: 


Active Medications





Alprazolam (Xanax)  0.5 mg PO BID PRN


   PRN Reason: Anxiety


Apixaban (Eliquis)  5 mg PO Q12HR [facility]


   Last Admin: 02/23/20 21:52 Dose:  5 mg


   Documented by: 


Doxepin HCl (Sinequan)  10 mg PO QHS [facility]


   Last Admin: 02/23/20 21:52 Dose:  10 mg


   Documented by: 


Lamotrigine (Lamictal)  25 mg PO QDAY [facility]


   Last Admin: 02/23/20 09:34 Dose:  25 mg


   Documented by: 


Losartan Potassium (Cozaar)  25 mg PO QDAY [facility]


   Last Admin: 02/23/20 09:36 Dose:  Not Given


   Documented by: 


Melatonin (Melatonin)  5 mg PO QHS PRN


   PRN Reason: Sleep


   Last Admin: 02/21/20 22:06 Dose:  5 mg


   Documented by: 


Metoprolol Succinate (Metoprolol Xl)  25 mg PO QDAY [facility]


   Last Admin: 02/23/20 09:36 Dose:  Not Given


   Documented by: 


Mirtazapine (Remeron)  7.5 mg PO QHS [facility]


   Last Admin: 02/23/20 21:52 Dose:  7.5 mg


   Documented by: 


Miscellaneous Medication (Descovy 200-25 Mg Tablet)  1 tab PO DAILY [facility]


   Last Admin: 02/23/20 09:33 Dose:  1 tab


   Documented by: 


Miscellaneous Medication (Tivicay)  50 mg PO DAILY [facility]


   Last Admin: 02/23/20 09:33 Dose:  50 mg


   Documented by: 


Pantoprazole Sodium (Protonix)  40 mg PO DAILY [facility]


   Last Admin: 02/23/20 09:34 Dose:  40 mg


   Documented by: 


Sertraline HCl (Zoloft)  100 mg PO QDAY [facility]


   Last Admin: 02/23/20 09:34 Dose:  100 mg


   Documented by: 


Ziprasidone (Geodon)  10 mg IM Q4H PRN


   PRN Reason: Agitation











Results





- Results


Labs/Vitals: 


                             Laboratory Last Values











WBC  13.0 K/mm3 (4.5-11.0)  H  02/21/20  21:05    


 


RBC  3.98 M/mm3 (3.65-5.03)   02/21/20  21:05    


 


Hgb  13.5 gm/dl (11.8-15.2)   02/21/20  21:05    


 


Hct  41.8 % (35.5-45.6)   02/21/20  21:05    


 


MCV  105 fl (84-94)  H  02/21/20  21:05    


 


MCH  34 pg (28-32)  H  02/21/20  21:05    


 


MCHC  32 % (32-34)   02/21/20  21:05    


 


RDW  13.7 % (13.2-15.2)   02/21/20  21:05    


 


Plt Count  239 K/mm3 (140-440)   02/21/20  21:05    


 


Lymph #  Np   02/21/20  21:05    


 


Add Manual Diff  Complete   02/21/20  21:05    


 


Total Counted  100   02/21/20  21:05    


 


Seg Neuts % (Manual)  53.0 % (40.0-70.0)   02/21/20  21:05    


 


Band Neutrophils %  0 %  02/21/20  21:05    


 


Lymphocytes % (Manual)  33.0 % (13.4-35.0)   02/21/20  21:05    


 


Reactive Lymphs % (Man)  0 %  02/21/20  21:05    


 


Monocytes % (Manual)  11.0 % (0.0-7.3)  H  02/21/20  21:05    


 


Eosinophils % (Manual)  3.0 % (0.0-4.3)   02/21/20  21:05    


 


Basophils % (Manual)  0 % (0.0-1.8)   02/21/20  21:05    


 


Metamyelocytes %  0 %  02/21/20  21:05    


 


Myelocytes %  0 %  02/21/20  21:05    


 


Promyelocytes %  0 %  02/21/20  21:05    


 


Blast Cells %  0 %  02/21/20  21:05    


 


Nucleated RBC %  Not Reportable   02/21/20  21:05    


 


Seg Neutrophils # Man  6.9 K/mm3 (1.8-7.7)   02/21/20  21:05    


 


Band Neutrophils #  0.0 K/mm3  02/21/20  21:05    


 


Lymphocytes # (Manual)  4.3 K/mm3 (1.2-5.4)   02/21/20  21:05    


 


Abs React Lymphs (Man)  0.0 K/mm3  02/21/20  21:05    


 


Monocytes # (Manual)  1.4 K/mm3 (0.0-0.8)  H  02/21/20  21:05    


 


Eosinophils # (Manual)  0.4 K/mm3 (0.0-0.4)   02/21/20  21:05    


 


Basophils # (Manual)  0.0 K/mm3 (0.0-0.1)   02/21/20  21:05    


 


Metamyelocytes #  0.0 K/mm3  02/21/20  21:05    


 


Myelocytes #  0.0 K/mm3  02/21/20  21:05    


 


Promyelocytes #  0.0 K/mm3  02/21/20  21:05    


 


Blast Cells #  0.0 K/mm3  02/21/20  21:05    


 


WBC Morphology  Not Reportable   02/21/20  21:05    


 


Hypersegmented Neuts  Not Reportable   02/21/20  21:05    


 


Hyposegmented Neuts  Not Reportable   02/21/20  21:05    


 


Hypogranular Neuts  Not Reportable   02/21/20  21:05    


 


Smudge Cells  Not Reportable   02/21/20  21:05    


 


Toxic Granulation  Not Reportable   02/21/20  21:05    


 


Toxic Vacuolation  Not Reportable   02/21/20  21:05    


 


Dohle Bodies  Not Reportable   02/21/20  21:05    


 


Pelger-Huet Anomaly  Not Reportable   02/21/20  21:05    


 


Lucio Rods  Not Reportable   02/21/20  21:05    


 


Platelet Estimate  Consistent w auto   02/21/20  21:05    


 


Clumped Platelets  Not Reportable   02/21/20  21:05    


 


Plt Clumps, EDTA  Not Reportable   02/21/20  21:05    


 


Large Platelets  Not Reportable   02/21/20  21:05    


 


Giant Platelets  Not Reportable   02/21/20  21:05    


 


Platelet Satelliting  Not Reportable   02/21/20  21:05    


 


Plt Morphology Comment  Not Reportable   02/21/20  21:05    


 


RBC Morphology  Not Reportable   02/21/20  21:05    


 


Dimorphic RBCs  Not Reportable   02/21/20  21:05    


 


Polychromasia  Not Reportable   02/21/20  21:05    


 


Hypochromasia  Not Reportable   02/21/20  21:05    


 


Poikilocytosis  Not Reportable   02/21/20  21:05    


 


Anisocytosis  1+   02/21/20  21:05    


 


Microcytosis  Not Reportable   02/21/20  21:05    


 


Macrocytosis  Not Reportable   02/21/20  21:05    


 


Spherocytes  Not Reportable   02/21/20  21:05    


 


Pappenheimer Bodies  Not Reportable   02/21/20  21:05    


 


Sickle Cells  Not Reportable   02/21/20  21:05    


 


Target Cells  Not Reportable   02/21/20  21:05    


 


Tear Drop Cells  Not Reportable   02/21/20  21:05    


 


Ovalocytes  Not Reportable   02/21/20  21:05    


 


Helmet Cells  Not Reportable   02/21/20  21:05    


 


Sosa-Lawson Bodies  Not Reportable   02/21/20  21:05    


 


Cabot Rings  Not Reportable   02/21/20  21:05    


 


Elie Cells  Not Reportable   02/21/20  21:05    


 


Bite Cells  Not Reportable   02/21/20  21:05    


 


Crenated Cell  Not Reportable   02/21/20  21:05    


 


Elliptocytes  Not Reportable   02/21/20  21:05    


 


Acanthocytes (Spur)  Not Reportable   02/21/20  21:05    


 


Rouleaux  Not Reportable   02/21/20  21:05    


 


Hemoglobin C Crystals  Not Reportable   02/21/20  21:05    


 


Schistocytes  Not Reportable   02/21/20  21:05    


 


Malaria parasites  Not Reportable   02/21/20  21:05    


 


Sarabjit Bodies  Not Reportable   02/21/20  21:05    


 


Hem Pathologist Commnt  No   02/21/20  21:05    


 


Sodium  140 mmol/L (137-145)   02/21/20  21:05    


 


Potassium  4.4 mmol/L (3.6-5.0)   02/21/20  21:05    


 


Chloride  100.7 mmol/L ()   02/21/20  21:05    


 


Carbon Dioxide  25 mmol/L (22-30)   02/21/20  21:05    


 


Anion Gap  19 mmol/L  02/21/20  21:05    


 


BUN  14 mg/dL (9-20)   02/21/20  21:05    


 


Creatinine  0.9 mg/dL (0.8-1.5)   02/21/20  21:05    


 


Estimated GFR  > 60 ml/min  02/21/20  21:05    


 


BUN/Creatinine Ratio  16 %  02/21/20  21:05    


 


Glucose  145 mg/dL ()  H  02/21/20  21:05    


 


POC Glucose  157  ()  H  02/21/20  22:02    


 


Hemoglobin A1c  4.9 % (4-6)   02/21/20  21:05    


 


Calcium  9.4 mg/dL (8.4-10.2)   02/21/20  21:05    


 


Total Bilirubin  0.50 mg/dL (0.1-1.2)   02/21/20  21:05    


 


AST  33 units/L (5-40)   02/21/20  21:05    


 


ALT  15 units/L (7-56)   02/21/20  21:05    


 


Alkaline Phosphatase  73 units/L ()   02/21/20  21:05    


 


Total Protein  7.5 g/dL (6.3-8.2)   02/21/20  21:05    


 


Albumin  3.3 g/dL (3.9-5)  L  02/21/20  21:05    


 


Albumin/Globulin Ratio  0.8 %  02/21/20  21:05    


 


Triglycerides  59 mg/dL (2-149)   02/21/20  21:05    


 


Cholesterol  149 mg/dL ()   02/21/20  21:05    


 


LDL Cholesterol Direct  92 mg/dL ()   02/21/20  21:05    


 


HDL Cholesterol  49 mg/dL (40-59)   02/21/20  21:05    


 


Cholesterol/HDL Ratio  3.04 %  02/21/20  21:05    


 


TSH  4.370 mlU/mL (0.270-4.200)  H  02/21/20  21:05    


 


Urine Color  Yellow  (Yellow)   02/21/20  Unknown


 


Urine Turbidity  Clear  (Clear)   02/21/20  Unknown


 


Urine pH  8.0  (5.0-7.0)  H  02/21/20  Unknown


 


Ur Specific Gravity  1.009  (1.003-1.030)   02/21/20  Unknown


 


Urine Protein  <15 mg/dl mg/dL (Negative)   02/21/20  Unknown


 


Urine Glucose (UA)  Neg mg/dL (Negative)   02/21/20  Unknown


 


Urine Ketones  Neg mg/dL (Negative)   02/21/20  Unknown


 


Urine Blood  Neg  (Negative)   02/21/20  Unknown


 


Urine Nitrite  Neg  (Negative)   02/21/20  Unknown


 


Urine Bilirubin  Neg  (Negative)   02/21/20  Unknown


 


Urine Urobilinogen  < 2.0 mg/dL (<2.0)   02/21/20  Unknown


 


Ur Leukocyte Esterase  Neg  (Negative)   02/21/20  Unknown


 


Urine WBC (Auto)  < 1.0 /HPF (0.0-6.0)   02/21/20  Unknown


 


Urine RBC (Auto)  4.0 /HPF (0.0-6.0)   02/21/20  Unknown


 


Urine Mucus  Few /HPF  02/21/20  Unknown








                                Last Vital Signs











Temp  98.4 F   02/23/20 22:00


 


Pulse  70   02/23/20 19:27


 


Resp  18   02/23/20 22:00


 


BP  141/62   02/23/20 19:27


 


Pulse Ox  98   02/23/20 22:00

## 2020-02-25 RX ADMIN — APIXABAN SCH MG: 5 TABLET, FILM COATED ORAL at 11:15

## 2020-02-25 RX ADMIN — LOSARTAN POTASSIUM SCH MG: 25 TABLET, FILM COATED ORAL at 11:14

## 2020-02-25 RX ADMIN — MIRTAZAPINE SCH MG: 15 TABLET, FILM COATED ORAL at 21:57

## 2020-02-25 RX ADMIN — APIXABAN SCH MG: 5 TABLET, FILM COATED ORAL at 21:57

## 2020-02-25 RX ADMIN — PANTOPRAZOLE SODIUM SCH MG: 40 TABLET, DELAYED RELEASE ORAL at 11:15

## 2020-02-25 RX ADMIN — SERTRALINE SCH MG: 50 TABLET, FILM COATED ORAL at 11:15

## 2020-02-25 RX ADMIN — DOXEPIN HYDROCHLORIDE SCH MG: 10 CAPSULE ORAL at 21:57

## 2020-02-25 RX ADMIN — METOPROLOL SUCCINATE SCH MG: 25 TABLET, FILM COATED, EXTENDED RELEASE ORAL at 11:16

## 2020-02-25 NOTE — PROGRESS NOTE
Subjective


Date of service: 02/25/20


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


Subjective Comment: 


The patient's medical record was reviewed and the patient's progress was 

discussed with the nursing staff. The nurse note states, pt is alert and carter

ented x4, calm and cooperative, bright affect and pleasant, denies si/hi, denies

a/v hallucination, medication compliant, self care, takes care of his colostomy 

and andrade, no complaint voiced, on 1litre o2 n/c, slept for 8hrs, no distress 

notes, will continue to monitor for safety.





During my interview with the patient this morning, the patient was sitting in 

the day room alert oriented x3.  He is dressed appropriately for the occasion, 

he maintains eye contact the patient is noted on oxygen therapy.  The patient 

denies suicidal or homicidal ideations he contracts for safety.  He denies 

visual or auditory hallucinations. Patient reports that he does get depressed at

times due to his situation and his diagnosis and states, "I guess this is what 

got me here".  He reports that he is sleeping and eating well.  The patient 

stated, I just want to get home so I can get the PET scan and have this fully 

removed".





Reason for continued inpatient treatment: Preventing decomposition/improving 

treatment





ROS:


Constitutional: Negative for weight loss


ENT: Negative for stridor


Respiratory: Negative for cough or hemoptysis


All other systems reviewed and are negative





MSE


 Appearance: Dressed appropriately, good eye contact


 Behavior: calm and cooperative


 Mood: "ok"


 Affect: Congruent


 Thought Process: goal directed


 Speech: normal tone and pace


 Thought Content


    Suicidal: denies


    Homicidal: Denies


    Hallucinations: Denies


    Delusions: Denies


 Consciousness: Alert


 Cognition/Memory: Good


 Insight/Judgment: Limited





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





Treatment Plan 


Due to the psychiatric conditions and treatment listed in the Assessment and 

Plan - the patient requires continued hospitalization.


Will continue inpatient treatment to allow for medication adjustment and 

monitoring.


Will continue q15 min safety checks.


Will encourage the use of environmental modifications and non-pharmacologic 

approaches for the management of behavioral and psychological symptoms.


Medication adjustment made today: none


Will continue current psych medications


Monitor for medication side effects.


The patient will continue on medications for physical illnesses, and Hospitalist

will closely monitor these


Continue intensive physical and occupational therapies.


Monitor patient's mood, sleep, appetite, and behavior closely.


Encourage patient to participate in individual and group therapeutic sessions on

the elizabeth.


Will provide a safe and therapeutic environment for patient.














Medications and Allergies


                                    Allergies











Allergy/AdvReac Type Severity Reaction Status Date / Time


 


No Known Allergies Allergy   Unverified 02/21/20 14:20











                                Home Medications











 Medication  Instructions  Recorded  Confirmed  Last Taken  Type


 


Ambien 10 mg PO HS 02/22/20 02/22/20 Unknown History


 


Ciprofloxacin HCl 500 mg PO BID 02/22/20 02/22/20 Unknown History


 


Descovy 200-25 mg Tablet 1 tab PO DAILY 02/22/20 02/22/20 Unknown History


 


Eliquis 5 mg PO BID 02/22/20 02/22/20 Unknown History


 


Lamictal 25 mg PO DAILY 02/22/20 02/22/20 Unknown History


 


Losartan 25 mg PO DAILY 02/22/20 02/22/20 Unknown History


 


Metoprolol Succinate 25 mg PO DAILY 02/22/20 02/22/20 Unknown History


 


Metronidazole 500 mg PO Q8HR MDD 21 TABLETS 02/22/20 02/22/20 Unknown History


 


Mirtazapine 7.5 mg PO QHS 02/22/20 02/22/20 Unknown History


 


Pantoprazole Sodium 40 mg PO DAILY 02/22/20 02/22/20 Unknown History


 


Sertraline 50 mg PO DAILY 02/22/20 02/22/20 Unknown History


 


Tivicay 50 mg PO DAILY 02/22/20 02/22/20 Unknown History


 


Xanax TAB 0.5 mg PO BID PRN 02/22/20 02/22/20 Unknown History











Active Meds: 


Active Medications





Alprazolam (Xanax)  0.5 mg PO BID PRN


   PRN Reason: Anxiety


Apixaban (Eliquis)  5 mg PO Q12HR [facility]


   Last Admin: 02/24/20 21:39 Dose:  5 mg


   Documented by: 


Doxepin HCl (Sinequan)  10 mg PO QHS [facility]


   Last Admin: 02/24/20 21:38 Dose:  10 mg


   Documented by: 


Lamotrigine (Lamictal)  25 mg PO QDAY [facility]


   Last Admin: 02/24/20 11:53 Dose:  25 mg


   Documented by: 


Losartan Potassium (Cozaar)  25 mg PO QDAY [facility]


   Last Admin: 02/24/20 11:54 Dose:  25 mg


   Documented by: 


Melatonin (Melatonin)  5 mg PO QHS PRN


   PRN Reason: Sleep


   Last Admin: 02/21/20 22:06 Dose:  5 mg


   Documented by: 


Metoprolol Succinate (Metoprolol Xl)  25 mg PO QDAY [facility]


   Last Admin: 02/24/20 11:56 Dose:  25 mg


   Documented by: 


Mirtazapine (Remeron)  7.5 mg PO QHS [facility]


   Last Admin: 02/24/20 21:38 Dose:  7.5 mg


   Documented by: 


Miscellaneous Medication (Descovy 200-25 Mg Tablet)  1 tab PO DAILY [facility]


   Last Admin: 02/24/20 11:58 Dose:  1 tab


   Documented by: 


Miscellaneous Medication (Tivicay)  50 mg PO DAILY [facility]


   Last Admin: 02/24/20 11:54 Dose:  50 mg


   Documented by: 


Pantoprazole Sodium (Protonix)  40 mg PO DAILY [facility]


   Last Admin: 02/24/20 11:53 Dose:  40 mg


   Documented by: 


Sertraline HCl (Zoloft)  100 mg PO QDAY [facility]


   Last Admin: 02/24/20 11:52 Dose:  100 mg


   Documented by: 


Ziprasidone (Geodon)  10 mg IM Q4H PRN


   PRN Reason: Agitation











Results





- Results


Labs/Vitals: 


                             Laboratory Last Values











WBC  13.0 K/mm3 (4.5-11.0)  H  02/21/20  21:05    


 


RBC  3.98 M/mm3 (3.65-5.03)   02/21/20  21:05    


 


Hgb  13.5 gm/dl (11.8-15.2)   02/21/20  21:05    


 


Hct  41.8 % (35.5-45.6)   02/21/20  21:05    


 


MCV  105 fl (84-94)  H  02/21/20  21:05    


 


MCH  34 pg (28-32)  H  02/21/20  21:05    


 


MCHC  32 % (32-34)   02/21/20  21:05    


 


RDW  13.7 % (13.2-15.2)   02/21/20  21:05    


 


Plt Count  239 K/mm3 (140-440)   02/21/20  21:05    


 


Lymph #  Np   02/21/20  21:05    


 


Add Manual Diff  Complete   02/21/20  21:05    


 


Total Counted  100   02/21/20  21:05    


 


Seg Neuts % (Manual)  53.0 % (40.0-70.0)   02/21/20  21:05    


 


Band Neutrophils %  0 %  02/21/20  21:05    


 


Lymphocytes % (Manual)  33.0 % (13.4-35.0)   02/21/20  21:05    


 


Reactive Lymphs % (Man)  0 %  02/21/20  21:05    


 


Monocytes % (Manual)  11.0 % (0.0-7.3)  H  02/21/20  21:05    


 


Eosinophils % (Manual)  3.0 % (0.0-4.3)   02/21/20  21:05    


 


Basophils % (Manual)  0 % (0.0-1.8)   02/21/20  21:05    


 


Metamyelocytes %  0 %  02/21/20  21:05    


 


Myelocytes %  0 %  02/21/20  21:05    


 


Promyelocytes %  0 %  02/21/20  21:05    


 


Blast Cells %  0 %  02/21/20  21:05    


 


Nucleated RBC %  Not Reportable   02/21/20  21:05    


 


Seg Neutrophils # Man  6.9 K/mm3 (1.8-7.7)   02/21/20  21:05    


 


Band Neutrophils #  0.0 K/mm3  02/21/20  21:05    


 


Lymphocytes # (Manual)  4.3 K/mm3 (1.2-5.4)   02/21/20  21:05    


 


Abs React Lymphs (Man)  0.0 K/mm3  02/21/20  21:05    


 


Monocytes # (Manual)  1.4 K/mm3 (0.0-0.8)  H  02/21/20  21:05    


 


Eosinophils # (Manual)  0.4 K/mm3 (0.0-0.4)   02/21/20  21:05    


 


Basophils # (Manual)  0.0 K/mm3 (0.0-0.1)   02/21/20  21:05    


 


Metamyelocytes #  0.0 K/mm3  02/21/20  21:05    


 


Myelocytes #  0.0 K/mm3  02/21/20  21:05    


 


Promyelocytes #  0.0 K/mm3  02/21/20  21:05    


 


Blast Cells #  0.0 K/mm3  02/21/20  21:05    


 


WBC Morphology  Not Reportable   02/21/20  21:05    


 


Hypersegmented Neuts  Not Reportable   02/21/20  21:05    


 


Hyposegmented Neuts  Not Reportable   02/21/20  21:05    


 


Hypogranular Neuts  Not Reportable   02/21/20  21:05    


 


Smudge Cells  Not Reportable   02/21/20  21:05    


 


Toxic Granulation  Not Reportable   02/21/20  21:05    


 


Toxic Vacuolation  Not Reportable   02/21/20  21:05    


 


Dohle Bodies  Not Reportable   02/21/20  21:05    


 


Pelger-Huet Anomaly  Not Reportable   02/21/20  21:05    


 


Lucio Rods  Not Reportable   02/21/20  21:05    


 


Platelet Estimate  Consistent w auto   02/21/20  21:05    


 


Clumped Platelets  Not Reportable   02/21/20  21:05    


 


Plt Clumps, EDTA  Not Reportable   02/21/20  21:05    


 


Large Platelets  Not Reportable   02/21/20  21:05    


 


Giant Platelets  Not Reportable   02/21/20  21:05    


 


Platelet Satelliting  Not Reportable   02/21/20  21:05    


 


Plt Morphology Comment  Not Reportable   02/21/20  21:05    


 


RBC Morphology  Not Reportable   02/21/20  21:05    


 


Dimorphic RBCs  Not Reportable   02/21/20  21:05    


 


Polychromasia  Not Reportable   02/21/20  21:05    


 


Hypochromasia  Not Reportable   02/21/20  21:05    


 


Poikilocytosis  Not Reportable   02/21/20  21:05    


 


Anisocytosis  1+   02/21/20  21:05    


 


Microcytosis  Not Reportable   02/21/20  21:05    


 


Macrocytosis  Not Reportable   02/21/20  21:05    


 


Spherocytes  Not Reportable   02/21/20  21:05    


 


Pappenheimer Bodies  Not Reportable   02/21/20  21:05    


 


Sickle Cells  Not Reportable   02/21/20  21:05    


 


Target Cells  Not Reportable   02/21/20  21:05    


 


Tear Drop Cells  Not Reportable   02/21/20  21:05    


 


Ovalocytes  Not Reportable   02/21/20  21:05    


 


Helmet Cells  Not Reportable   02/21/20  21:05    


 


Sosa-Wilson Bodies  Not Reportable   02/21/20  21:05    


 


Cabot Rings  Not Reportable   02/21/20  21:05    


 


Elie Cells  Not Reportable   02/21/20  21:05    


 


Bite Cells  Not Reportable   02/21/20  21:05    


 


Crenated Cell  Not Reportable   02/21/20  21:05    


 


Elliptocytes  Not Reportable   02/21/20  21:05    


 


Acanthocytes (Spur)  Not Reportable   02/21/20  21:05    


 


Rouleaux  Not Reportable   02/21/20  21:05    


 


Hemoglobin C Crystals  Not Reportable   02/21/20  21:05    


 


Schistocytes  Not Reportable   02/21/20  21:05    


 


Malaria parasites  Not Reportable   02/21/20  21:05    


 


Sarabjit Bodies  Not Reportable   02/21/20  21:05    


 


Hem Pathologist Commnt  No   02/21/20  21:05    


 


Sodium  140 mmol/L (137-145)   02/21/20  21:05    


 


Potassium  4.4 mmol/L (3.6-5.0)   02/21/20  21:05    


 


Chloride  100.7 mmol/L ()   02/21/20  21:05    


 


Carbon Dioxide  25 mmol/L (22-30)   02/21/20  21:05    


 


Anion Gap  19 mmol/L  02/21/20  21:05    


 


BUN  14 mg/dL (9-20)   02/21/20  21:05    


 


Creatinine  0.9 mg/dL (0.8-1.5)   02/21/20  21:05    


 


Estimated GFR  > 60 ml/min  02/21/20  21:05    


 


BUN/Creatinine Ratio  16 %  02/21/20  21:05    


 


Glucose  145 mg/dL ()  H  02/21/20  21:05    


 


POC Glucose  157  ()  H  02/21/20  22:02    


 


Hemoglobin A1c  4.9 % (4-6)   02/21/20  21:05    


 


Calcium  9.4 mg/dL (8.4-10.2)   02/21/20  21:05    


 


Total Bilirubin  0.50 mg/dL (0.1-1.2)   02/21/20  21:05    


 


AST  33 units/L (5-40)   02/21/20  21:05    


 


ALT  15 units/L (7-56)   02/21/20  21:05    


 


Alkaline Phosphatase  73 units/L ()   02/21/20  21:05    


 


Total Protein  7.5 g/dL (6.3-8.2)   02/21/20  21:05    


 


Albumin  3.3 g/dL (3.9-5)  L  02/21/20  21:05    


 


Albumin/Globulin Ratio  0.8 %  02/21/20  21:05    


 


Triglycerides  59 mg/dL (2-149)   02/21/20  21:05    


 


Cholesterol  149 mg/dL ()   02/21/20  21:05    


 


LDL Cholesterol Direct  92 mg/dL ()   02/21/20  21:05    


 


HDL Cholesterol  49 mg/dL (40-59)   02/21/20  21:05    


 


Cholesterol/HDL Ratio  3.04 %  02/21/20  21:05    


 


TSH  4.370 mlU/mL (0.270-4.200)  H  02/21/20  21:05    


 


Urine Color  Yellow  (Yellow)   02/21/20  Unknown


 


Urine Turbidity  Clear  (Clear)   02/21/20  Unknown


 


Urine pH  8.0  (5.0-7.0)  H  02/21/20  Unknown


 


Ur Specific Gravity  1.009  (1.003-1.030)   02/21/20  Unknown


 


Urine Protein  <15 mg/dl mg/dL (Negative)   02/21/20  Unknown


 


Urine Glucose (UA)  Neg mg/dL (Negative)   02/21/20  Unknown


 


Urine Ketones  Neg mg/dL (Negative)   02/21/20  Unknown


 


Urine Blood  Neg  (Negative)   02/21/20  Unknown


 


Urine Nitrite  Neg  (Negative)   02/21/20  Unknown


 


Urine Bilirubin  Neg  (Negative)   02/21/20  Unknown


 


Urine Urobilinogen  < 2.0 mg/dL (<2.0)   02/21/20  Unknown


 


Ur Leukocyte Esterase  Neg  (Negative)   02/21/20  Unknown


 


Urine WBC (Auto)  < 1.0 /HPF (0.0-6.0)   02/21/20  Unknown


 


Urine RBC (Auto)  4.0 /HPF (0.0-6.0)   02/21/20  Unknown


 


Urine Mucus  Few /HPF  02/21/20  Unknown








                                Last Vital Signs











Temp  98.2 F   02/24/20 22:00


 


Pulse  62   02/24/20 22:00


 


Resp  18   02/24/20 22:00


 


BP  137/59   02/24/20 22:00


 


Pulse Ox  96   02/24/20 22:00

## 2020-02-26 VITALS — SYSTOLIC BLOOD PRESSURE: 153 MMHG | DIASTOLIC BLOOD PRESSURE: 65 MMHG

## 2020-02-26 RX ADMIN — SERTRALINE SCH MG: 50 TABLET, FILM COATED ORAL at 09:59

## 2020-02-26 RX ADMIN — PANTOPRAZOLE SODIUM SCH MG: 40 TABLET, DELAYED RELEASE ORAL at 09:55

## 2020-02-26 RX ADMIN — METOPROLOL SUCCINATE SCH MG: 25 TABLET, FILM COATED, EXTENDED RELEASE ORAL at 09:58

## 2020-02-26 RX ADMIN — LOSARTAN POTASSIUM SCH MG: 25 TABLET, FILM COATED ORAL at 09:56

## 2020-02-26 RX ADMIN — APIXABAN SCH MG: 5 TABLET, FILM COATED ORAL at 09:59

## 2020-02-26 NOTE — DISCHARGE SUMMARY
Providers





- Providers


Date of Admission: 


02/21/20 14:21





Date of discharge: 02/26/20


Attending physician: 


PHILOMENA SWEET MD





                                        





02/21/20 14:21


Consult to Physician [CONS] Routine 


   Comment: 


   Consulting Provider: ALANA ROMAN


   Physician Instructions: 


   Reason For Exam: manage medical conditions





02/22/20 07:49


Consult to Wound/ET Nurse [CONS] Routine 


   Reason For Exam: wound eval











Primary care physician: 


PRIMARY CARE MD








Hospitalization


Condition: Stable


Disposition: DC-01 TO HOME OR SELFCARE


Allergies/Adverse Reactions: 


                                    Allergies





No Known Allergies Allergy (Unverified 02/21/20 14:20)


   








Vital Signs: 


                                Last Vital Signs











Temp  98.3 F   02/25/20 09:22


 


Pulse  70   02/25/20 11:16


 


Resp  18   02/25/20 09:22


 


BP  151/62   02/25/20 11:16


 


Pulse Ox  96   02/24/20 22:00











Last Lab: 


                             Laboratory Last Values











WBC  13.0 K/mm3 (4.5-11.0)  H  02/21/20  21:05    


 


RBC  3.98 M/mm3 (3.65-5.03)   02/21/20  21:05    


 


Hgb  13.5 gm/dl (11.8-15.2)   02/21/20  21:05    


 


Hct  41.8 % (35.5-45.6)   02/21/20  21:05    


 


MCV  105 fl (84-94)  H  02/21/20  21:05    


 


MCH  34 pg (28-32)  H  02/21/20  21:05    


 


MCHC  32 % (32-34)   02/21/20  21:05    


 


RDW  13.7 % (13.2-15.2)   02/21/20  21:05    


 


Plt Count  239 K/mm3 (140-440)   02/21/20  21:05    


 


Lymph #  Np   02/21/20  21:05    


 


Add Manual Diff  Complete   02/21/20  21:05    


 


Total Counted  100   02/21/20  21:05    


 


Seg Neuts % (Manual)  53.0 % (40.0-70.0)   02/21/20  21:05    


 


Band Neutrophils %  0 %  02/21/20  21:05    


 


Lymphocytes % (Manual)  33.0 % (13.4-35.0)   02/21/20  21:05    


 


Reactive Lymphs % (Man)  0 %  02/21/20  21:05    


 


Monocytes % (Manual)  11.0 % (0.0-7.3)  H  02/21/20  21:05    


 


Eosinophils % (Manual)  3.0 % (0.0-4.3)   02/21/20  21:05    


 


Basophils % (Manual)  0 % (0.0-1.8)   02/21/20  21:05    


 


Metamyelocytes %  0 %  02/21/20  21:05    


 


Myelocytes %  0 %  02/21/20  21:05    


 


Promyelocytes %  0 %  02/21/20  21:05    


 


Blast Cells %  0 %  02/21/20  21:05    


 


Nucleated RBC %  Not Reportable   02/21/20  21:05    


 


Seg Neutrophils # Man  6.9 K/mm3 (1.8-7.7)   02/21/20  21:05    


 


Band Neutrophils #  0.0 K/mm3  02/21/20  21:05    


 


Lymphocytes # (Manual)  4.3 K/mm3 (1.2-5.4)   02/21/20  21:05    


 


Abs React Lymphs (Man)  0.0 K/mm3  02/21/20  21:05    


 


Monocytes # (Manual)  1.4 K/mm3 (0.0-0.8)  H  02/21/20  21:05    


 


Eosinophils # (Manual)  0.4 K/mm3 (0.0-0.4)   02/21/20  21:05    


 


Basophils # (Manual)  0.0 K/mm3 (0.0-0.1)   02/21/20  21:05    


 


Metamyelocytes #  0.0 K/mm3  02/21/20  21:05    


 


Myelocytes #  0.0 K/mm3  02/21/20  21:05    


 


Promyelocytes #  0.0 K/mm3  02/21/20  21:05    


 


Blast Cells #  0.0 K/mm3  02/21/20  21:05    


 


WBC Morphology  Not Reportable   02/21/20  21:05    


 


Hypersegmented Neuts  Not Reportable   02/21/20  21:05    


 


Hyposegmented Neuts  Not Reportable   02/21/20  21:05    


 


Hypogranular Neuts  Not Reportable   02/21/20  21:05    


 


Smudge Cells  Not Reportable   02/21/20  21:05    


 


Toxic Granulation  Not Reportable   02/21/20  21:05    


 


Toxic Vacuolation  Not Reportable   02/21/20  21:05    


 


Dohle Bodies  Not Reportable   02/21/20  21:05    


 


Pelger-Huet Anomaly  Not Reportable   02/21/20  21:05    


 


Lucio Rods  Not Reportable   02/21/20  21:05    


 


Platelet Estimate  Consistent w auto   02/21/20  21:05    


 


Clumped Platelets  Not Reportable   02/21/20  21:05    


 


Plt Clumps, EDTA  Not Reportable   02/21/20  21:05    


 


Large Platelets  Not Reportable   02/21/20  21:05    


 


Giant Platelets  Not Reportable   02/21/20  21:05    


 


Platelet Satelliting  Not Reportable   02/21/20  21:05    


 


Plt Morphology Comment  Not Reportable   02/21/20  21:05    


 


RBC Morphology  Not Reportable   02/21/20  21:05    


 


Dimorphic RBCs  Not Reportable   02/21/20  21:05    


 


Polychromasia  Not Reportable   02/21/20  21:05    


 


Hypochromasia  Not Reportable   02/21/20  21:05    


 


Poikilocytosis  Not Reportable   02/21/20  21:05    


 


Anisocytosis  1+   02/21/20  21:05    


 


Microcytosis  Not Reportable   02/21/20  21:05    


 


Macrocytosis  Not Reportable   02/21/20  21:05    


 


Spherocytes  Not Reportable   02/21/20  21:05    


 


Pappenheimer Bodies  Not Reportable   02/21/20  21:05    


 


Sickle Cells  Not Reportable   02/21/20  21:05    


 


Target Cells  Not Reportable   02/21/20  21:05    


 


Tear Drop Cells  Not Reportable   02/21/20  21:05    


 


Ovalocytes  Not Reportable   02/21/20  21:05    


 


Helmet Cells  Not Reportable   02/21/20  21:05    


 


Sosa-[place]te Bodies  Not Reportable   02/21/20  21:05    


 


Cabot Rings  Not Reportable   02/21/20  21:05    


 


Elie Cells  Not Reportable   02/21/20  21:05    


 


Bite Cells  Not Reportable   02/21/20  21:05    


 


Crenated Cell  Not Reportable   02/21/20  21:05    


 


Elliptocytes  Not Reportable   02/21/20  21:05    


 


Acanthocytes (Spur)  Not Reportable   02/21/20  21:05    


 


Rouleaux  Not Reportable   02/21/20  21:05    


 


Hemoglobin C Crystals  Not Reportable   02/21/20  21:05    


 


Schistocytes  Not Reportable   02/21/20  21:05    


 


Malaria parasites  Not Reportable   02/21/20  21:05    


 


Sarabjit Bodies  Not Reportable   02/21/20  21:05    


 


Hem Pathologist Commnt  No   02/21/20  21:05    


 


Sodium  140 mmol/L (137-145)   02/21/20  21:05    


 


Potassium  4.4 mmol/L (3.6-5.0)   02/21/20  21:05    


 


Chloride  100.7 mmol/L ()   02/21/20  21:05    


 


Carbon Dioxide  25 mmol/L (22-30)   02/21/20  21:05    


 


Anion Gap  19 mmol/L  02/21/20  21:05    


 


BUN  14 mg/dL (9-20)   02/21/20  21:05    


 


Creatinine  0.9 mg/dL (0.8-1.5)   02/21/20  21:05    


 


Estimated GFR  > 60 ml/min  02/21/20  21:05    


 


BUN/Creatinine Ratio  16 %  02/21/20  21:05    


 


Glucose  145 mg/dL ()  H  02/21/20  21:05    


 


POC Glucose  157  ()  H  02/21/20  22:02    


 


Hemoglobin A1c  4.9 % (4-6)   02/21/20  21:05    


 


Calcium  9.4 mg/dL (8.4-10.2)   02/21/20  21:05    


 


Total Bilirubin  0.50 mg/dL (0.1-1.2)   02/21/20  21:05    


 


AST  33 units/L (5-40)   02/21/20  21:05    


 


ALT  15 units/L (7-56)   02/21/20  21:05    


 


Alkaline Phosphatase  73 units/L ()   02/21/20  21:05    


 


Total Protein  7.5 g/dL (6.3-8.2)   02/21/20  21:05    


 


Albumin  3.3 g/dL (3.9-5)  L  02/21/20  21:05    


 


Albumin/Globulin Ratio  0.8 %  02/21/20  21:05    


 


Triglycerides  59 mg/dL (2-149)   02/21/20  21:05    


 


Cholesterol  149 mg/dL ()   02/21/20  21:05    


 


LDL Cholesterol Direct  92 mg/dL ()   02/21/20  21:05    


 


HDL Cholesterol  49 mg/dL (40-59)   02/21/20  21:05    


 


Cholesterol/HDL Ratio  3.04 %  02/21/20  21:05    


 


TSH  4.370 mlU/mL (0.270-4.200)  H  02/21/20  21:05    


 


Urine Color  Yellow  (Yellow)   02/21/20  Unknown


 


Urine Turbidity  Clear  (Clear)   02/21/20  Unknown


 


Urine pH  8.0  (5.0-7.0)  H  02/21/20  Unknown


 


Ur Specific Gravity  1.009  (1.003-1.030)   02/21/20  Unknown


 


Urine Protein  <15 mg/dl mg/dL (Negative)   02/21/20  Unknown


 


Urine Glucose (UA)  Neg mg/dL (Negative)   02/21/20  Unknown


 


Urine Ketones  Neg mg/dL (Negative)   02/21/20  Unknown


 


Urine Blood  Neg  (Negative)   02/21/20  Unknown


 


Urine Nitrite  Neg  (Negative)   02/21/20  Unknown


 


Urine Bilirubin  Neg  (Negative)   02/21/20  Unknown


 


Urine Urobilinogen  < 2.0 mg/dL (<2.0)   02/21/20  Unknown


 


Ur Leukocyte Esterase  Neg  (Negative)   02/21/20  Unknown


 


Urine WBC (Auto)  < 1.0 /HPF (0.0-6.0)   02/21/20  Unknown


 


Urine RBC (Auto)  4.0 /HPF (0.0-6.0)   02/21/20  Unknown


 


Urine Mucus  Few /HPF  02/21/20  Unknown














Core Measure Documentation





- Palliative Care


Palliative Care/ Comfort Measures: Not Applicable





- Core Measures


Any of the following diagnoses?: none





Exam





- Constitutional


Vitals: 


                                        











Temp Pulse Resp BP Pulse Ox


 


 98.3 F   70   18   151/62   96 


 


 02/25/20 09:22  02/25/20 11:16  02/25/20 09:22  02/25/20 11:16  02/24/20 22:00














- EENT


Eyes: Present: PERRL, EOM intact


ENT: clear oral mucosa, hearing decreased





- Neck


Neck: Present: supple, normal ROM





- Respiratory


Respiratory effort: normal





- Abdominal


Male genitourinary: Present: normal





- Integumentary


Integumentary: Present: clear, warm, dry





Plan


Care Plan Goals: 


Maintain good and stable mental health


Plan of Treatment: 


The patient should be complaint with medications, not use drugs, and not drink 

alcohol. The patient understands that if suicidal, homicidal or endangering 

feelings arise he should seek assistance including but not limited to calling 

911, the crisis hotline, and the emergency room. 


Follow up with outpatient psychiatry and primary doctor in 7 to 14 days


Health Concerns: 


hiv/aids, rectal ca, heartfailure


Follow up with: 


PRIMARY CARE,MD [Primary Care Provider] - 7 Days


Prescriptions: 


Mirtazapine 7.5 mg PO QHS #30


Doxepin [SINEquan] 10 mg PO QHS #30 capsule


lamoTRIgine [LaMICtal] 25 mg PO QDAY #30 tablet


Sertraline [Zoloft] 100 mg PO QDAY #30 tablet